# Patient Record
Sex: FEMALE | Race: BLACK OR AFRICAN AMERICAN | NOT HISPANIC OR LATINO
[De-identification: names, ages, dates, MRNs, and addresses within clinical notes are randomized per-mention and may not be internally consistent; named-entity substitution may affect disease eponyms.]

---

## 2017-01-30 ENCOUNTER — APPOINTMENT (OUTPATIENT)
Dept: HEMATOLOGY ONCOLOGY | Facility: CLINIC | Age: 37
End: 2017-01-30

## 2018-07-26 ENCOUNTER — EMERGENCY (EMERGENCY)
Facility: HOSPITAL | Age: 38
LOS: 0 days | Discharge: HOME | End: 2018-07-26
Attending: EMERGENCY MEDICINE | Admitting: EMERGENCY MEDICINE
Payer: COMMERCIAL

## 2018-07-26 VITALS
SYSTOLIC BLOOD PRESSURE: 114 MMHG | DIASTOLIC BLOOD PRESSURE: 54 MMHG | OXYGEN SATURATION: 100 % | TEMPERATURE: 99 F | HEART RATE: 70 BPM

## 2018-07-26 VITALS — WEIGHT: 199.96 LBS

## 2018-07-26 DIAGNOSIS — R68.84 JAW PAIN: ICD-10-CM

## 2018-07-26 DIAGNOSIS — R51 HEADACHE: ICD-10-CM

## 2018-07-26 DIAGNOSIS — J34.9 UNSPECIFIED DISORDER OF NOSE AND NASAL SINUSES: ICD-10-CM

## 2018-07-26 PROCEDURE — 99284 EMERGENCY DEPT VISIT MOD MDM: CPT

## 2018-07-26 RX ORDER — FLUTICASONE PROPIONATE 50 MCG
1 SPRAY, SUSPENSION NASAL ONCE
Qty: 0 | Refills: 0 | Status: COMPLETED | OUTPATIENT
Start: 2018-07-26 | End: 2018-07-26

## 2018-07-26 RX ADMIN — Medication 1 TABLET(S): at 16:15

## 2018-07-26 RX ADMIN — Medication 1 SPRAY(S): at 16:16

## 2018-07-26 NOTE — ED STATDOCS - PROGRESS NOTE DETAILS
39 yo female presents with yellow malodorous nasal discharge, Left sided facial pain and headache and jaw pain x 5 days. Went to the dentist who had an XR which was fine. Small area of white on the L upper gingiva with ttp to the L frotnal and L maxillary sinus. Possible early gingiva abscess vs sinus infection. Will give abx and flonase and d/c home. -Don Valles PA-C

## 2018-07-26 NOTE — ED STATDOCS - ENMT, MLM
tenderness to percussion to left frontal and maxillary sinus. Clear TMs. Nasal mucosa clear.  Mouth with normal mucosa. Throat has no vesicles, no oropharyngeal exudates and uvula is midline.

## 2018-07-26 NOTE — ED STATDOCS - OBJECTIVE STATEMENT
39 y/o female with no PMHx presents to the ED c/o jaw pain radiating to head beginning 5 days ago. +ear pain. +yellow, malodorous discharge from nose. +throat pain. +subjective fever at night. Saw Dentist 5 days ago with unremarkable exam. Taking Tylenol for fever, last taken at 3am.

## 2018-07-26 NOTE — ED ADULT TRIAGE NOTE - CHIEF COMPLAINT QUOTE
Pt c/o headache, left sided facial pain with yellow colored fluid coming out of mouth with bad odor. pt states she has also had fevers in the night s symptoms started saturday

## 2018-07-26 NOTE — ED STATDOCS - ATTENDING CONTRIBUTION TO CARE
I, Manav Ying, performed the initial face to face bedside interview with this patient regarding history of present illness, review of symptoms and relevant past medical, social and family history.  I completed an independent physical examination.  I was the initial provider who evaluated this patient. I have signed out the follow up of any pending tests (i.e. labs, radiological studies) to the ACP.  I have communicated the patient’s plan of care and disposition with the ACP.  The history, relevant review of systems, past medical and surgical history, medical decision making, and physical examination was documented by the scribe in my presence and I attest to the accuracy of the documentation.

## 2018-09-18 ENCOUNTER — APPOINTMENT (OUTPATIENT)
Dept: RADIOLOGY | Facility: CLINIC | Age: 38
End: 2018-09-18
Payer: COMMERCIAL

## 2018-09-18 ENCOUNTER — OUTPATIENT (OUTPATIENT)
Dept: OUTPATIENT SERVICES | Facility: HOSPITAL | Age: 38
LOS: 1 days | End: 2018-09-18
Payer: COMMERCIAL

## 2018-09-18 DIAGNOSIS — Z00.8 ENCOUNTER FOR OTHER GENERAL EXAMINATION: ICD-10-CM

## 2018-09-18 PROCEDURE — 72070 X-RAY EXAM THORAC SPINE 2VWS: CPT

## 2018-09-18 PROCEDURE — 72070 X-RAY EXAM THORAC SPINE 2VWS: CPT | Mod: 26

## 2018-09-24 ENCOUNTER — RESULT REVIEW (OUTPATIENT)
Age: 38
End: 2018-09-24